# Patient Record
Sex: MALE | Race: WHITE | NOT HISPANIC OR LATINO | URBAN - METROPOLITAN AREA
[De-identification: names, ages, dates, MRNs, and addresses within clinical notes are randomized per-mention and may not be internally consistent; named-entity substitution may affect disease eponyms.]

---

## 2024-03-05 ENCOUNTER — OFFICE VISIT (OUTPATIENT)
Dept: URGENT CARE | Facility: CLINIC | Age: 39
End: 2024-03-05
Payer: COMMERCIAL

## 2024-03-05 VITALS
HEIGHT: 71 IN | TEMPERATURE: 97 F | BODY MASS INDEX: 22.4 KG/M2 | RESPIRATION RATE: 18 BRPM | HEART RATE: 98 BPM | OXYGEN SATURATION: 98 % | SYSTOLIC BLOOD PRESSURE: 128 MMHG | DIASTOLIC BLOOD PRESSURE: 78 MMHG | WEIGHT: 160 LBS

## 2024-03-05 DIAGNOSIS — K04.7 DENTAL ABSCESS: Primary | ICD-10-CM

## 2024-03-05 PROCEDURE — 99203 OFFICE O/P NEW LOW 30 MIN: CPT | Mod: S$GLB,,, | Performed by: NURSE PRACTITIONER

## 2024-03-05 RX ORDER — IBUPROFEN 600 MG/1
600 TABLET ORAL EVERY 8 HOURS PRN
Qty: 30 TABLET | Refills: 0 | Status: SHIPPED | OUTPATIENT
Start: 2024-03-05

## 2024-03-05 RX ORDER — AMOXICILLIN AND CLAVULANATE POTASSIUM 875; 125 MG/1; MG/1
1 TABLET, FILM COATED ORAL EVERY 12 HOURS
Qty: 14 TABLET | Refills: 0 | Status: SHIPPED | OUTPATIENT
Start: 2024-03-05 | End: 2024-03-12

## 2024-03-05 NOTE — LETTER
March 5, 2024      [facility] [place]  [address]  [address]  [place] [address]  Phone: [phone]  Fax: [phone]       Patient: Amando Chakraborty   YOB: 1985  Date of Visit: 03/05/2024    To Whom It May Concern:    Norma Chakraborty  was at [facility] on 03/05/2024. The patient may return to work on 03/07/2024 with no restriction if he is overall feeling better and fever free for 24 hours. If you have any questions or concerns, or if I can be of further assistance, please do not hesitate to contact me.    Sincerely,    Mariaelena Ly NP

## 2024-03-05 NOTE — PROGRESS NOTES
"Subjective:      Patient ID: Amando Chakraborty is a 38 y.o. male.    Vitals:  height is 5' 11" (1.803 m) and weight is 72.6 kg (160 lb). His oral temperature is 97 °F (36.1 °C). His blood pressure is 128/78 and his pulse is 98. His respiration is 18 and oxygen saturation is 98%.     Chief Complaint: Dental Pain    36-year-old male presents with right upper tooth pain and tooth abscess.  He reports mild facial swelling and tenderness.  He denies any fever or difficulty swallowing.    Dental Pain   This is a new problem. The current episode started in the past 7 days. The problem occurs constantly. The problem has been rapidly worsening. The pain is at a severity of 8/10. The pain is moderate. Associated symptoms include facial pain and thermal sensitivity. Pertinent negatives include no fever or sinus pressure.       Constitution: Negative for chills, sweating, fatigue, fever and generalized weakness.   HENT:  Positive for dental problem. Negative for ear pain, sinus pressure and sore throat.    Neck: Negative for neck pain and neck stiffness.   Respiratory:  Negative for cough, sputum production and shortness of breath.    Gastrointestinal:  Negative for abdominal pain, nausea, vomiting and diarrhea.   Genitourinary:  Negative for dysuria.   Musculoskeletal:  Negative for pain.   Skin:  Negative for color change and rash.   Neurological:  Negative for dizziness and headaches.      Objective:     Physical Exam   Constitutional: He is oriented to person, place, and time.   HENT:   Head: Normocephalic and atraumatic.   Mouth/Throat:      Comments: Mild right sided facial swelling. No fluctuance or erythema. Right upper gum erythematous, swollen, and tender  Eyes: Pupils are equal, round, and reactive to light. Right eye exhibits no discharge. Left eye exhibits no discharge.   Cardiovascular: Normal rate.   Abdominal: Normal appearance.   Musculoskeletal: Normal range of motion.         General: Normal range of motion. "   Neurological: He is alert and oriented to person, place, and time.   Skin: Skin is warm and dry. Capillary refill takes less than 2 seconds.   Psychiatric: His behavior is normal. Mood normal.       Assessment:     1. Dental abscess        Plan:     38-year-old male presents with right upper dental abscess.  He will be seeing his dentist tomorrow.  We will start him on Augmentin.  Emergency precautions discussed for worsening.    Dental abscess    Other orders  -     amoxicillin-clavulanate 875-125mg (AUGMENTIN) 875-125 mg per tablet; Take 1 tablet by mouth every 12 (twelve) hours. for 7 days  Dispense: 14 tablet; Refill: 0  -     ibuprofen (ADVIL,MOTRIN) 600 MG tablet; Take 1 tablet (600 mg total) by mouth every 8 (eight) hours as needed for Pain.  Dispense: 30 tablet; Refill: 0

## 2024-03-05 NOTE — PATIENT INSTRUCTIONS
Take medication as prescribed.  Use ibuprofen and Tylenol as needed for pain and discomfort.  Follow-up as discussed with your dentist tomorrow.  Emergency room precautions for fever, worsening pain, worsening facial swelling, difficulty swallowing or any other acutely worsening symptoms or concerns at all.